# Patient Record
Sex: MALE | Race: WHITE | NOT HISPANIC OR LATINO
[De-identification: names, ages, dates, MRNs, and addresses within clinical notes are randomized per-mention and may not be internally consistent; named-entity substitution may affect disease eponyms.]

---

## 2017-02-01 ENCOUNTER — BH HISTORICAL (OUTPATIENT)
Dept: OTHER | Age: 22
End: 2017-02-01

## 2017-03-01 ENCOUNTER — BH HISTORICAL (OUTPATIENT)
Dept: OTHER | Age: 22
End: 2017-03-01

## 2017-04-12 ENCOUNTER — BH HISTORICAL (OUTPATIENT)
Dept: OTHER | Age: 22
End: 2017-04-12

## 2017-05-10 ENCOUNTER — BH HISTORICAL (OUTPATIENT)
Dept: OTHER | Age: 22
End: 2017-05-10

## 2017-07-10 ENCOUNTER — CHARTING TRANS (OUTPATIENT)
Dept: OTHER | Age: 22
End: 2017-07-10

## 2018-02-20 ENCOUNTER — OFFICE VISIT (OUTPATIENT)
Dept: FAMILY MEDICINE CLINIC | Facility: CLINIC | Age: 23
End: 2018-02-20

## 2018-02-20 ENCOUNTER — HOSPITAL ENCOUNTER (OUTPATIENT)
Dept: ULTRASOUND IMAGING | Age: 23
Discharge: HOME | End: 2018-02-20
Attending: FAMILY MEDICINE
Payer: COMMERCIAL

## 2018-02-20 ENCOUNTER — TELEPHONE (OUTPATIENT)
Dept: FAMILY MEDICINE CLINIC | Facility: CLINIC | Age: 23
End: 2018-02-20

## 2018-02-20 VITALS
RESPIRATION RATE: 16 BRPM | BODY MASS INDEX: 25.09 KG/M2 | WEIGHT: 169.38 LBS | TEMPERATURE: 99 F | DIASTOLIC BLOOD PRESSURE: 60 MMHG | SYSTOLIC BLOOD PRESSURE: 104 MMHG | HEART RATE: 90 BPM | HEIGHT: 69 IN | OXYGEN SATURATION: 98 %

## 2018-02-20 DIAGNOSIS — N50.811 RIGHT TESTICULAR PAIN: ICD-10-CM

## 2018-02-20 DIAGNOSIS — R10.31 RIGHT GROIN PAIN: ICD-10-CM

## 2018-02-20 DIAGNOSIS — F33.1 MODERATE EPISODE OF RECURRENT MAJOR DEPRESSIVE DISORDER (HCC): ICD-10-CM

## 2018-02-20 DIAGNOSIS — N50.811 RIGHT TESTICULAR PAIN: Primary | ICD-10-CM

## 2018-02-20 DIAGNOSIS — F12.10 CANNABIS USE DISORDER, MILD, IN CONTROLLED ENVIRONMENT: ICD-10-CM

## 2018-02-20 PROCEDURE — 93975 VASCULAR STUDY: CPT | Performed by: FAMILY MEDICINE

## 2018-02-20 PROCEDURE — 99203 OFFICE O/P NEW LOW 30 MIN: CPT | Performed by: FAMILY MEDICINE

## 2018-02-20 PROCEDURE — 76882 US LMTD JT/FCL EVL NVASC XTR: CPT | Performed by: FAMILY MEDICINE

## 2018-02-20 PROCEDURE — 76870 US EXAM SCROTUM: CPT | Performed by: FAMILY MEDICINE

## 2018-02-20 NOTE — PROGRESS NOTES
Shirin Marin is a 23year old male. Patient presents with:  Establish Care: New patient here for abdominal pain he has had for a few months pain started in groin area. HPI:   Patient is seen for initial visit.     Right testicular pain constant, daily auscultation  CARDIO: RRR without murmur  ABDOMEN: Soft, mild right lower quadrant tenderness, no rebound or guarding.   : No palpable lump or swelling in the groin,+ tenderness to palpation, no scrotal swelling or erythema, tenderness to palpation over t

## 2018-02-20 NOTE — TELEPHONE ENCOUNTER
Received phone call urgently on stat ultrasound scrotal ultrasound just shows small right  hydrocele (I clarified with radiologist, there's a discrepancy in report) and small R epididymal head cyst, groin ultrasound shows nothing remarkable (normal size ly

## 2018-02-21 NOTE — TELEPHONE ENCOUNTER
Called patient yesterday and could not reach him, please call patient with results, please see result note.

## 2018-02-22 ENCOUNTER — TELEPHONE (OUTPATIENT)
Dept: FAMILY MEDICINE CLINIC | Facility: CLINIC | Age: 23
End: 2018-02-22

## 2018-02-22 PROBLEM — F12.10 CANNABIS USE DISORDER, MILD, IN CONTROLLED ENVIRONMENT: Status: ACTIVE | Noted: 2018-02-22

## 2018-02-22 NOTE — TELEPHONE ENCOUNTER
----- Message from Ramiro Mojica MD sent at 2/21/2018  8:09 AM CST -----  Right small hydrocele and epididymal cyst, no hernia, please refer patient to urology, [facility], called patient no reply.     Status:  Edited Result - FINAL   Visible to patient:

## 2023-05-05 ENCOUNTER — TELEPHONE (OUTPATIENT)
Dept: FAMILY MEDICINE CLINIC | Facility: CLINIC | Age: 28
End: 2023-05-05

## 2023-05-16 ENCOUNTER — PATIENT OUTREACH (OUTPATIENT)
Dept: CASE MANAGEMENT | Age: 28
End: 2023-05-16

## 2023-05-16 NOTE — PROCEDURES
The office order for PCP removal request is Approved and finalized on May 16, 2023.     Thanks,  [facility] [employer]